# Patient Record
Sex: MALE | Race: WHITE | URBAN - METROPOLITAN AREA
[De-identification: names, ages, dates, MRNs, and addresses within clinical notes are randomized per-mention and may not be internally consistent; named-entity substitution may affect disease eponyms.]

---

## 2017-11-12 ENCOUNTER — HOSPITAL ENCOUNTER (EMERGENCY)
Facility: CLINIC | Age: 62
Discharge: ANOTHER HOSPITAL | End: 2017-11-12
Attending: EMERGENCY MEDICINE | Admitting: EMERGENCY MEDICINE
Payer: COMMERCIAL

## 2017-11-12 ENCOUNTER — APPOINTMENT (OUTPATIENT)
Dept: GENERAL RADIOLOGY | Facility: CLINIC | Age: 62
End: 2017-11-12
Attending: EMERGENCY MEDICINE
Payer: COMMERCIAL

## 2017-11-12 VITALS
DIASTOLIC BLOOD PRESSURE: 78 MMHG | SYSTOLIC BLOOD PRESSURE: 135 MMHG | TEMPERATURE: 98.2 F | RESPIRATION RATE: 20 BRPM | OXYGEN SATURATION: 99 % | HEART RATE: 70 BPM

## 2017-11-12 DIAGNOSIS — T81.30XA WOUND DEHISCENCE: ICD-10-CM

## 2017-11-12 LAB
ANION GAP SERPL CALCULATED.3IONS-SCNC: 6 MMOL/L (ref 3–14)
BASOPHILS # BLD AUTO: 0.1 10E9/L (ref 0–0.2)
BASOPHILS NFR BLD AUTO: 0.6 %
BUN SERPL-MCNC: 21 MG/DL (ref 7–30)
CALCIUM SERPL-MCNC: 9 MG/DL (ref 8.5–10.1)
CHLORIDE SERPL-SCNC: 102 MMOL/L (ref 94–109)
CO2 SERPL-SCNC: 27 MMOL/L (ref 20–32)
CREAT SERPL-MCNC: 0.89 MG/DL (ref 0.66–1.25)
DIFFERENTIAL METHOD BLD: ABNORMAL
EOSINOPHIL # BLD AUTO: 0.2 10E9/L (ref 0–0.7)
EOSINOPHIL NFR BLD AUTO: 2.6 %
ERYTHROCYTE [DISTWIDTH] IN BLOOD BY AUTOMATED COUNT: 13.6 % (ref 10–15)
GFR SERPL CREATININE-BSD FRML MDRD: 87 ML/MIN/1.7M2
GLUCOSE SERPL-MCNC: 108 MG/DL (ref 70–99)
HCT VFR BLD AUTO: 39.1 % (ref 40–53)
HGB BLD-MCNC: 13 G/DL (ref 13.3–17.7)
IMM GRANULOCYTES # BLD: 0 10E9/L (ref 0–0.4)
IMM GRANULOCYTES NFR BLD: 0.3 %
INR PPP: 2.34 (ref 0.86–1.14)
LYMPHOCYTES # BLD AUTO: 1.3 10E9/L (ref 0.8–5.3)
LYMPHOCYTES NFR BLD AUTO: 16.9 %
MCH RBC QN AUTO: 28.5 PG (ref 26.5–33)
MCHC RBC AUTO-ENTMCNC: 33.2 G/DL (ref 31.5–36.5)
MCV RBC AUTO: 86 FL (ref 78–100)
MONOCYTES # BLD AUTO: 0.6 10E9/L (ref 0–1.3)
MONOCYTES NFR BLD AUTO: 8.1 %
NEUTROPHILS # BLD AUTO: 5.5 10E9/L (ref 1.6–8.3)
NEUTROPHILS NFR BLD AUTO: 71.5 %
NRBC # BLD AUTO: 0 10*3/UL
NRBC BLD AUTO-RTO: 0 /100
PLATELET # BLD AUTO: 376 10E9/L (ref 150–450)
POTASSIUM SERPL-SCNC: 4 MMOL/L (ref 3.4–5.3)
RBC # BLD AUTO: 4.56 10E12/L (ref 4.4–5.9)
SODIUM SERPL-SCNC: 135 MMOL/L (ref 133–144)
WBC # BLD AUTO: 7.7 10E9/L (ref 4–11)

## 2017-11-12 PROCEDURE — 96365 THER/PROPH/DIAG IV INF INIT: CPT

## 2017-11-12 PROCEDURE — 29505 APPLICATION LONG LEG SPLINT: CPT | Mod: RT

## 2017-11-12 PROCEDURE — 25000128 H RX IP 250 OP 636: Performed by: EMERGENCY MEDICINE

## 2017-11-12 PROCEDURE — 73562 X-RAY EXAM OF KNEE 3: CPT | Mod: RT

## 2017-11-12 PROCEDURE — 85610 PROTHROMBIN TIME: CPT | Performed by: EMERGENCY MEDICINE

## 2017-11-12 PROCEDURE — 99284 EMERGENCY DEPT VISIT MOD MDM: CPT | Mod: 25

## 2017-11-12 PROCEDURE — 80048 BASIC METABOLIC PNL TOTAL CA: CPT | Performed by: EMERGENCY MEDICINE

## 2017-11-12 PROCEDURE — 85025 COMPLETE CBC W/AUTO DIFF WBC: CPT | Performed by: EMERGENCY MEDICINE

## 2017-11-12 RX ORDER — BUPIVACAINE HYDROCHLORIDE 5 MG/ML
INJECTION, SOLUTION PERINEURAL
Status: DISCONTINUED
Start: 2017-11-12 | End: 2017-11-12 | Stop reason: HOSPADM

## 2017-11-12 RX ADMIN — CEFAZOLIN SODIUM 1 G: 1 INJECTION, SOLUTION INTRAVENOUS at 11:22

## 2017-11-12 ASSESSMENT — ENCOUNTER SYMPTOMS: WOUND: 1

## 2017-11-12 NOTE — ED PROVIDER NOTES
History     Chief Complaint:  Wound Check      HPI   Chris Nunes is a 62 year old male, anticoagulated on Coumadin, who presents to the emergency department today for evaluation of a wound check. The patient states that this morning around 0855 he lost his footing and subsequently fell forward on the stair and re-opened his incision to his right TKA site that was done on 10/24 by Dr. Sreekanth Guardado at [facility] ([facility]). He reports some bleeding from this site. He does not feel like he dislocated this, though he is unsure. The patient states that his last INR was 2.3.  He denies any numbness.    Allergies:  Penicillins- anaphylaxis   Bactrim- swelling      Medications:    Coumadin    Past Medical History:    Atrial fibrillation    Past Surgical History:    Right TKA    Family History:    History reviewed.  No significant family history.     Social History:.  The patient states that he has a friend here and he was able to drive himself to [place] today.     Review of Systems   Skin: Positive for wound (Right knee incision split open).   All other systems reviewed and are negative.    Patient presents after falling on the stairs this morning and re-opening the incision to his right TKA site. He reports he lost his footing and landed onto his right knee on the stairs.    Physical Exam     Patient Vitals for the past 24 hrs:   BP Temp Temp src Pulse Resp SpO2   11/12/17 0931 139/82 98.2  F (36.8  C) Oral 70 20 99 %       Physical Exam   Musculoskeletal:        Legs:    GEN: patient smiling, no distress  HEAD: atraumatic, normocephalic  EYES: pupils reactive,  conjunctivae normal  ENT: TMs flat and white bilaterally, oropharynx normal with no erythema or exudate, mucus membranes moist  NECK: no cervical LAD  RESPIRATORY: no tachypnea, breath sounds clear to auscultation (no rales, wheezes, rhonchi)  CVS: normal S1/S2, no murmurs/rubs/gallops  ABDOMEN: soft, nontender, no masses or organomegaly, no  rebound, positive bowel sounds  BACK: no lesions  EXTREMITIES: intact pulses x 4, full range of motion at joints, no edema, cap refill < 3 seconds  SKIN: warm and dry,right knee with vertical TKA incision that has diastasis of 0.5-1cm in the middle portion.  Hardware and deep tissues can be seen.  Minimal bleeding from the wound.  Entire surgical incision has opened up.  NEURO: GCS 15, cranial nerves intact.  Motor- moves all 4 extremities, DF 5/5.  Patient can bend right knee to at least 30 degrees.  Sensation- intact. Coordination- limps on the right knee  Overall symmetrical exam  HEME: oozing right knee         Imaging:  Radiographic findings were communicated with the patient who voiced understanding of the findings.    Knee XR, 3 views, right:  Postoperative changes of total knee arthroplasty. No  lucency around the hardware identified. No evidence of fracture. Bony  alignment within normal limits. As per radiology.     Laboratory:  CBC: HGB 13.0 (L), HCT 39.1 (L) o/w WNL. (WBC 7.7, )   CMP: Glucose 108 (H) o/w WNL. (Creatinine: 0.89)  INR: 2.34 (H)    Procedures:  Bupivacaine 0.5% injection into subQ of knee (around wound).  5cc.  Wound irrigated.  Wet--> dry kerlix wrapping + adaptic placed on right knee  Knee immobilizer    Interventions:  Heplock  Cardiac/Sp02 monitoring  1122 Ancef 1 g IV    Emergency Department Course:  Nursing notes and vitals reviewed. I performed an exam of the patient as documented above.     1000  Dr Fuentes, on call for Dr. Guardado, called back.     Blood drawn. This was sent to the lab for further testing, results above.    The patient was sent for the following imaging studies while in the emergency department: Knee XR, 3 views, right.    1047 Awaiting on xray    1107 Updated patient    1216 Dr. Esquivel ([place] [facility]) received report on the patient  /84  Pulse 70  Temp 98.2  F (36.8  C) (Oral)  Resp 20  SpO2 100%     1232 I again spoke with Dr. Esquivel of [place]  [facility].     Findings and plan explained to the Patient. Patient will drive himself to [facility]. Discussed the case with Dr. Fuentes and Dr. Esquivel, who will see the patient for further monitoring, evaluation, and treatment.    Patient driving directly to [place] ED.    Impression & Plan    Medical Decision Making:  Chris Nunes is a very pleasant 62 year old male who has diastasis of his right total knee arthroplasty wound and we can see some of the subcutaneous and orthopedic hardware underneath.  Entire incision has opened up.  Patient is on coumadin.   I have discussed the case with Dr. Fuentes- who is on-call for the patient's surgeon Dr. Guardado. It sounds as though he is going to have the wound looked at today at [facility] which where [facility] and Dr. Guardado provide surgical services. The patient also talked to the PA for the group independently who said that this could wait till tomorrow, but I do think with subcutaneous and/or hardware exposure that the patient should be seen today. He was given 1 G of Ancef for prophylaxis so that the wound does not get infected, and we did check his INR and labs because he is on Coumadin and he is right where he should be. The patient will get a knee immobilizer and drive himself to [facility].   Xray with no dislocation.  Wound is irrigated and wet--> dry dressings placed.  Knee immobilizer in place.    Diagnosis:    ICD-10-CM    1. Wound dehiscence T81.30XA KO IMMOBILIZER CANVAS LONGIT       Disposition:  Transferred. The patient is to drive himself to [facility].     Instructions to patient:  We discussed your case with dr Fuentes (on call for Dr Guardado).  They need to see this wound today at [place], to determine if the stitches need to be performed today or tomorrow.    Xray copy with you.  You were given a dose of IV antibiotics.    Knee immobilizer.    Drive directly to [place].    Scribe Disclosure:  Zuly HU  Awilda, am serving as a scribe on 11/12/2017 at 9:43 AM to personally document services performed by Ethel Brooke MD based on my observations and the provider's statements to me.     Zuly Kaiser  11/12/2017   [facility] EMERGENCY DEPARTMENT       Ethel Brooke MD  11/13/17 0901

## 2017-11-12 NOTE — DISCHARGE INSTRUCTIONS
We discussed your case with dr Fuentes (on call for Dr Guardado).  They need to see this wound today at [place], to determine if the stitches need to be performed today or tomorrow.    Xray copy with you.  You were given a dose of IV antibiotics.    Knee immobilizer.    Drive directly to [place].

## 2017-11-12 NOTE — ED NOTES
Patient presents after falling on the stairs this morning and re-opening the incision to his right TKA site. He reports he lost his footing and landed onto his right knee on the stairs. Bleeding is minimal at this time, he is alert and oriented, ABCs intact.